# Patient Record
Sex: MALE | Race: WHITE | NOT HISPANIC OR LATINO | URBAN - METROPOLITAN AREA
[De-identification: names, ages, dates, MRNs, and addresses within clinical notes are randomized per-mention and may not be internally consistent; named-entity substitution may affect disease eponyms.]

---

## 2024-10-22 NOTE — ASU PATIENT PROFILE, ADULT - FALL HARM RISK - UNIVERSAL INTERVENTIONS
Bed in lowest position, wheels locked, appropriate side rails in place/Call bell, personal items and telephone in reach/Instruct patient to call for assistance before getting out of bed or chair/Non-slip footwear when patient is out of bed/[place] to call system/Physically safe environment - no spills, clutter or unnecessary equipment/Purposeful Proactive Rounding/Room/bathroom lighting operational, light cord in reach

## 2024-10-22 NOTE — ASU PATIENT PROFILE, ADULT - ABLE TO REACH PT
Arrival time 8am/ nothing to eat or drink after 12 mid-night 10/22/2024/ water for medication if applicable /  photo ID and insurance card/ comfortable clothing/ escort to take him home/ address and phone number/no

## 2024-10-22 NOTE — ASU PATIENT PROFILE, ADULT - NSICDXPASTSURGICALHX_GEN_ALL_CORE_FT
PAST SURGICAL HISTORY:  Abnormal findings on esophagogastroduodenoscopy (EGD)     H/O colonoscopy     S/P appendectomy

## 2024-10-23 ENCOUNTER — OUTPATIENT (OUTPATIENT)
Dept: OUTPATIENT SERVICES | Facility: HOSPITAL | Age: 74
LOS: 1 days | Discharge: HOME | End: 2024-10-23

## 2024-10-23 VITALS
SYSTOLIC BLOOD PRESSURE: 116 MMHG | OXYGEN SATURATION: 96 % | RESPIRATION RATE: 16 BRPM | TEMPERATURE: 98 F | DIASTOLIC BLOOD PRESSURE: 64 MMHG | HEART RATE: 67 BPM

## 2024-10-23 VITALS
TEMPERATURE: 99 F | HEIGHT: 73 IN | WEIGHT: 201.06 LBS | OXYGEN SATURATION: 98 % | HEART RATE: 74 BPM | SYSTOLIC BLOOD PRESSURE: 160 MMHG | DIASTOLIC BLOOD PRESSURE: 79 MMHG | RESPIRATION RATE: 16 BRPM

## 2024-10-23 DIAGNOSIS — Z98.890 OTHER SPECIFIED POSTPROCEDURAL STATES: Chronic | ICD-10-CM

## 2024-10-23 DIAGNOSIS — R19.8 OTHER SPECIFIED SYMPTOMS AND SIGNS INVOLVING THE DIGESTIVE SYSTEM AND ABDOMEN: Chronic | ICD-10-CM

## 2024-10-23 DEVICE — LENS IOL CLAREON CCA0T0 19.5D
Type: IMPLANTABLE DEVICE | Site: LEFT | Status: NON-FUNCTIONAL
Removed: 2024-10-23

## 2024-10-23 RX ORDER — FENTANYL CITRATE-0.9 % NACL/PF 300MCG/30
25 PATIENT CONTROLLED ANALGESIA VIAL INJECTION
Refills: 0 | Status: DISCONTINUED | OUTPATIENT
Start: 2024-10-23 | End: 2024-10-23

## 2024-10-23 RX ORDER — ACETAMINOPHEN 325 MG
650 TABLET ORAL ONCE
Refills: 0 | Status: DISCONTINUED | OUTPATIENT
Start: 2024-10-23 | End: 2024-10-23

## 2024-10-23 RX ORDER — ASPIRIN 325 MG
1 TABLET ORAL
Refills: 0 | DISCHARGE

## 2024-10-23 RX ORDER — ONDANSETRON HCL/PF 4 MG/2 ML
4 VIAL (ML) INJECTION ONCE
Refills: 0 | Status: DISCONTINUED | OUTPATIENT
Start: 2024-10-23 | End: 2024-10-23

## 2024-10-23 RX ORDER — KETOROLAC TROMETHAMINE 0.5 %
1 DROPS OPHTHALMIC (EYE)
Refills: 0 | Status: COMPLETED | OUTPATIENT
Start: 2024-10-23 | End: 2024-10-23

## 2024-10-23 RX ORDER — PHENYLEPHRINE HYDROCHLORIDE 100 MG/ML
1 SOLUTION/ DROPS OPHTHALMIC ONCE
Refills: 0 | Status: COMPLETED | OUTPATIENT
Start: 2024-10-23 | End: 2024-10-23

## 2024-10-23 RX ORDER — TROPICAMIDE 1 %
1 DROPS OPHTHALMIC (EYE)
Refills: 0 | Status: COMPLETED | OUTPATIENT
Start: 2024-10-23 | End: 2024-10-23

## 2024-10-23 RX ORDER — PHENYLEPHRINE HYDROCHLORIDE 100 MG/ML
1 SOLUTION/ DROPS OPHTHALMIC
Refills: 0 | Status: COMPLETED | OUTPATIENT
Start: 2024-10-23 | End: 2024-10-23

## 2024-10-23 RX ORDER — SODIUM CHLORIDE IRRIG SOLUTION 0.9 %
1000 SOLUTION, IRRIGATION IRRIGATION
Refills: 0 | Status: DISCONTINUED | OUTPATIENT
Start: 2024-10-23 | End: 2024-10-23

## 2024-10-23 RX ORDER — ROSUVASTATIN CALCIUM 20 MG/1
1 TABLET, COATED ORAL
Refills: 0 | DISCHARGE

## 2024-10-23 RX ORDER — LOSARTAN POTASSIUM 100 MG/1
1 TABLET, FILM COATED ORAL
Refills: 0 | DISCHARGE

## 2024-10-23 RX ORDER — OFLOXACIN 3 MG/ML
1 SOLUTION/ DROPS OPHTHALMIC
Refills: 0 | Status: COMPLETED | OUTPATIENT
Start: 2024-10-23 | End: 2024-10-23

## 2024-10-23 RX ORDER — ACETAMINOPHEN 325 MG
1 TABLET ORAL
Refills: 0 | DISCHARGE

## 2024-10-23 RX ORDER — FAMOTIDINE 40 MG
0 TABLET ORAL
Refills: 0 | DISCHARGE

## 2024-10-23 RX ADMIN — PHENYLEPHRINE HYDROCHLORIDE 1 DROP(S): 100 SOLUTION/ DROPS OPHTHALMIC at 08:20

## 2024-10-23 RX ADMIN — Medication 1 DROP(S): at 08:25

## 2024-10-23 RX ADMIN — OFLOXACIN 1 DROP(S): 3 SOLUTION/ DROPS OPHTHALMIC at 08:20

## 2024-10-23 RX ADMIN — OFLOXACIN 1 DROP(S): 3 SOLUTION/ DROPS OPHTHALMIC at 08:25

## 2024-10-23 RX ADMIN — Medication 1 DROP(S): at 08:30

## 2024-10-23 RX ADMIN — PHENYLEPHRINE HYDROCHLORIDE 1 DROP(S): 100 SOLUTION/ DROPS OPHTHALMIC at 08:30

## 2024-10-23 RX ADMIN — Medication 1 DROP(S): at 08:20

## 2024-10-23 RX ADMIN — OFLOXACIN 1 DROP(S): 3 SOLUTION/ DROPS OPHTHALMIC at 08:30

## 2024-10-23 RX ADMIN — PHENYLEPHRINE HYDROCHLORIDE 1 DROP(S): 100 SOLUTION/ DROPS OPHTHALMIC at 08:25

## 2024-10-23 NOTE — OPERATIVE REPORT - OPERATIVE RPOSRT DETAILS
Date of Procedure: 10/23/24    Pre Op Dx: 1)Cataract OS                      2) Astigmatism OS    Post Op Dx: Same    Procedure: Cataract Extraction with Femtosecond laser incisions OS    Surgeon: Carlton    Anesthesia: LMAC    Indications: Patient complains of progressive decrease in vision impairing activities of daily     living and has significant refractive astigmatism . He has a macular pucker and will likely be having Vx and ERM peel.    ESTIMATED BLOOD LOSS: <1 cc    Procedure in detail:    Informed consent was obtained prior to admission. In the holding area, the operative eye was marked and topical antibiotic, mydriatic, and NSAID drops were administered. In the upright position, under topical anesthesia, the 3,6,9 o’clock meridians were marked at the limbus. The patient was brought to the Femtosecond laser suite and placed in the supine position. An operative timeout was observed. The laser program was reviewed and confirmed. Topical anesthetic was placed in the operative eye. The suction speculum was placed with care to center about the limbus. Suction was engaged and the well was filled with BSS. The patient was rotated under the laser and the chair elevated to submerge the [place].  Once docked and locked, the anterior segment OCT was performed and all anatomical margins were reviewed. The foot pedal was depressed and the entire laser program was carried out without loss of centration or suction. The suction was turned off and the patient removed from under the laser. The patient was then brought to the OR and placed in the supine position. Monitors were placed and IV sedation was given. The operative eye received topical anesthetic and was prepped and draped in the usual sterile fashion including Betadine irrigation of the conjunctival fornices and isolation of the lashes. An operative timeout was observed. A lid speculum was placed followed by additional topical anesthetic. A paracentesis was created and 1% non-preserved lidocaine was placed in the anterior chamber. The chamber was then filled with dispersive viscoelastic. A temporal near clear corneal incision was created laser created capsulotomy was assessed and the anterior capsule was removed. No tags or tears were noted. Gentle hydrodissection was performed allowing release of trapped gas from behind the lens nucleus. The lens nucleus was rotated. The phacoemulsification handpiece was then tested.  The nuclear segments were  and each was elevated to the level of the anterior capsule and emulsified, taking care to keep the ultrasound tip at or below the iris plane. Additional dispersive viscoelastic was placed to protect the corneal endothelium as needed. Coaxial I/A was then used to remove the lens cortex. The capsular bag was inflated with cohesive viscoelastic and the posterior capsule was polished. The power of the foldable IOL was confirmed. The lens was brought onto the surgical field and inspected. It was placed in the insertion cartridge and loaded into the injector. The lens was delivered into the capsular bag where it unfolded atraumatically and centered well. The viscoelastic was removed from behind and in front of the lens with the I/A. BSS on a canula was used to irrigate any residual viscoelastic or nuclear pieces from the angle and to hydrate the incisions. The chamber was returned to physiologic pressure with BSS. The IOL was noted to be centered and stable and the lens was gently pressed posteriorly to seat it in the capsular fornix.  The wounds were tested and found to be water tight. Since the patient may be having posterior segment surgery, a 10-0 nylon suture in an interrupted fashion was placed in the main incision.The lid speculum was removed. The eye received topical antibiotics and a shield. The patient tolerated the procedure well and went to recovery in good condition.

## 2024-10-23 NOTE — ASU DISCHARGE PLAN (ADULT/PEDIATRIC) - "IF YOU OR YOUR GUARDIAN/FAMILY IS A SMOKER, IT IS IMPORTANT FOR YOUR HEALTH TO STOP SMOKING. PLEASE BE AWARE THAT SECOND HAND SMOKE IS ALSO HARMFUL."
Statement Selected Dutasteride Counseling: Dustasteride Counseling:  I discussed with the patient the risks of use of dutasteride including but not limited to decreased libido, decreased ejaculate volume, and gynecomastia. Women who can become pregnant should not handle medication.  All of the patient's questions and concerns were addressed. Dutasteride Male Counseling: Dustasteride Counseling:  I discussed with the patient the risks of use of dutasteride including but not limited to decreased libido, decreased ejaculate volume, and gynecomastia. Women who can become pregnant should not handle medication.  All of the patient's questions and concerns were addressed.

## 2024-10-23 NOTE — ASU DISCHARGE PLAN (ADULT/PEDIATRIC) - FINANCIAL ASSISTANCE
[facility] provides services at a reduced cost to those who are determined to be eligible through [facility]’s financial assistance program. Information regarding [facility]’s financial assistance program can be found by going to [email or web address] or by calling [phone].

## (undated) DEVICE — PACK ANTERIOR SEGMENT

## (undated) DEVICE — CAPSULE GUARD I/A

## (undated) DEVICE — Device

## (undated) DEVICE — KNIFE ALCON STANDARD FULL HANDLE 15 DEG (PINK)

## (undated) DEVICE — PACK CENTURION 2.4MM

## (undated) DEVICE — GLV 8 PROTEXIS (WHITE)

## (undated) DEVICE — CANNULA FLEX TIP 45 DEG 27GAX22MM

## (undated) DEVICE — CANNULA IRR ANT CHAMBER 30G

## (undated) DEVICE — NUCLEUS HYDRODISSECTOR PEARCE ANGLED 25G X 22MM

## (undated) DEVICE — MARKING PEN DEVON DUAL TIP W RULER

## (undated) DEVICE — DRAPE MICROSCOPE KNOB COVER SMALL (2 PCS)

## (undated) DEVICE — SUT NYLON 10-0 12" CU-5

## (undated) DEVICE — SOL IRR BAG BSS 500ML